# Patient Record
Sex: MALE | Race: OTHER | NOT HISPANIC OR LATINO | URBAN - METROPOLITAN AREA
[De-identification: names, ages, dates, MRNs, and addresses within clinical notes are randomized per-mention and may not be internally consistent; named-entity substitution may affect disease eponyms.]

---

## 2020-11-19 ENCOUNTER — EMERGENCY (EMERGENCY)
Facility: HOSPITAL | Age: 45
LOS: 1 days | End: 2020-11-19
Attending: EMERGENCY MEDICINE
Payer: MEDICAID

## 2020-11-19 VITALS
SYSTOLIC BLOOD PRESSURE: 125 MMHG | RESPIRATION RATE: 19 BRPM | OXYGEN SATURATION: 98 % | TEMPERATURE: 98 F | DIASTOLIC BLOOD PRESSURE: 80 MMHG | HEART RATE: 72 BPM

## 2020-11-19 VITALS
RESPIRATION RATE: 18 BRPM | SYSTOLIC BLOOD PRESSURE: 155 MMHG | OXYGEN SATURATION: 99 % | DIASTOLIC BLOOD PRESSURE: 89 MMHG | HEART RATE: 70 BPM

## 2020-11-19 LAB
ALBUMIN SERPL ELPH-MCNC: 4.4 G/DL — SIGNIFICANT CHANGE UP (ref 3.3–5)
ALP SERPL-CCNC: 141 U/L — HIGH (ref 40–120)
ALT FLD-CCNC: 26 U/L — SIGNIFICANT CHANGE UP (ref 10–45)
ANION GAP SERPL CALC-SCNC: 12 MMOL/L — SIGNIFICANT CHANGE UP (ref 5–17)
APAP SERPL-MCNC: <15 UG/ML — SIGNIFICANT CHANGE UP (ref 10–30)
APPEARANCE UR: CLEAR — SIGNIFICANT CHANGE UP
APTT BLD: 27.5 SEC — SIGNIFICANT CHANGE UP (ref 27.5–35.5)
AST SERPL-CCNC: 25 U/L — SIGNIFICANT CHANGE UP (ref 10–40)
BASOPHILS # BLD AUTO: 0.01 K/UL — SIGNIFICANT CHANGE UP (ref 0–0.2)
BASOPHILS NFR BLD AUTO: 0.1 % — SIGNIFICANT CHANGE UP (ref 0–2)
BILIRUB SERPL-MCNC: 0.6 MG/DL — SIGNIFICANT CHANGE UP (ref 0.2–1.2)
BILIRUB UR-MCNC: NEGATIVE — SIGNIFICANT CHANGE UP
BLD GP AB SCN SERPL QL: NEGATIVE — SIGNIFICANT CHANGE UP
BUN SERPL-MCNC: 16 MG/DL — SIGNIFICANT CHANGE UP (ref 7–23)
CALCIUM SERPL-MCNC: 9.6 MG/DL — SIGNIFICANT CHANGE UP (ref 8.4–10.5)
CHLORIDE SERPL-SCNC: 99 MMOL/L — SIGNIFICANT CHANGE UP (ref 96–108)
CO2 SERPL-SCNC: 26 MMOL/L — SIGNIFICANT CHANGE UP (ref 22–31)
COLOR SPEC: YELLOW — SIGNIFICANT CHANGE UP
CREAT SERPL-MCNC: 0.88 MG/DL — SIGNIFICANT CHANGE UP (ref 0.5–1.3)
DIFF PNL FLD: NEGATIVE — SIGNIFICANT CHANGE UP
EOSINOPHIL # BLD AUTO: 0.01 K/UL — SIGNIFICANT CHANGE UP (ref 0–0.5)
EOSINOPHIL NFR BLD AUTO: 0.1 % — SIGNIFICANT CHANGE UP (ref 0–6)
ETHANOL SERPL-MCNC: SIGNIFICANT CHANGE UP MG/DL (ref 0–10)
GLUCOSE SERPL-MCNC: 140 MG/DL — HIGH (ref 70–99)
GLUCOSE UR QL: NEGATIVE — SIGNIFICANT CHANGE UP
HCT VFR BLD CALC: 42.1 % — SIGNIFICANT CHANGE UP (ref 39–50)
HGB BLD-MCNC: 14.2 G/DL — SIGNIFICANT CHANGE UP (ref 13–17)
IMM GRANULOCYTES NFR BLD AUTO: 0.4 % — SIGNIFICANT CHANGE UP (ref 0–1.5)
INR BLD: 1.07 RATIO — SIGNIFICANT CHANGE UP (ref 0.88–1.16)
KETONES UR-MCNC: SIGNIFICANT CHANGE UP
LEUKOCYTE ESTERASE UR-ACNC: NEGATIVE — SIGNIFICANT CHANGE UP
LYMPHOCYTES # BLD AUTO: 0.73 K/UL — LOW (ref 1–3.3)
LYMPHOCYTES # BLD AUTO: 9.5 % — LOW (ref 13–44)
MCHC RBC-ENTMCNC: 30.5 PG — SIGNIFICANT CHANGE UP (ref 27–34)
MCHC RBC-ENTMCNC: 33.7 GM/DL — SIGNIFICANT CHANGE UP (ref 32–36)
MCV RBC AUTO: 90.5 FL — SIGNIFICANT CHANGE UP (ref 80–100)
MONOCYTES # BLD AUTO: 0.61 K/UL — SIGNIFICANT CHANGE UP (ref 0–0.9)
MONOCYTES NFR BLD AUTO: 7.9 % — SIGNIFICANT CHANGE UP (ref 2–14)
NEUTROPHILS # BLD AUTO: 6.32 K/UL — SIGNIFICANT CHANGE UP (ref 1.8–7.4)
NEUTROPHILS NFR BLD AUTO: 82 % — HIGH (ref 43–77)
NITRITE UR-MCNC: NEGATIVE — SIGNIFICANT CHANGE UP
NRBC # BLD: 0 /100 WBCS — SIGNIFICANT CHANGE UP (ref 0–0)
PH UR: 6.5 — SIGNIFICANT CHANGE UP (ref 5–8)
PLATELET # BLD AUTO: 163 K/UL — SIGNIFICANT CHANGE UP (ref 150–400)
POTASSIUM SERPL-MCNC: 4.2 MMOL/L — SIGNIFICANT CHANGE UP (ref 3.5–5.3)
POTASSIUM SERPL-SCNC: 4.2 MMOL/L — SIGNIFICANT CHANGE UP (ref 3.5–5.3)
PROT SERPL-MCNC: 7.8 G/DL — SIGNIFICANT CHANGE UP (ref 6–8.3)
PROT UR-MCNC: ABNORMAL
PROTHROM AB SERPL-ACNC: 12.8 SEC — SIGNIFICANT CHANGE UP (ref 10.6–13.6)
RBC # BLD: 4.65 M/UL — SIGNIFICANT CHANGE UP (ref 4.2–5.8)
RBC # FLD: 12.8 % — SIGNIFICANT CHANGE UP (ref 10.3–14.5)
RH IG SCN BLD-IMP: POSITIVE — SIGNIFICANT CHANGE UP
SALICYLATES SERPL-MCNC: <2 MG/DL — LOW (ref 15–30)
SARS-COV-2 RNA SPEC QL NAA+PROBE: SIGNIFICANT CHANGE UP
SODIUM SERPL-SCNC: 137 MMOL/L — SIGNIFICANT CHANGE UP (ref 135–145)
SP GR SPEC: 1.03 — HIGH (ref 1.01–1.02)
UROBILINOGEN FLD QL: NEGATIVE — SIGNIFICANT CHANGE UP
WBC # BLD: 7.71 K/UL — SIGNIFICANT CHANGE UP (ref 3.8–10.5)
WBC # FLD AUTO: 7.71 K/UL — SIGNIFICANT CHANGE UP (ref 3.8–10.5)

## 2020-11-19 PROCEDURE — 99291 CRITICAL CARE FIRST HOUR: CPT

## 2020-11-19 PROCEDURE — U0003: CPT

## 2020-11-19 PROCEDURE — 80053 COMPREHEN METABOLIC PANEL: CPT

## 2020-11-19 PROCEDURE — 86850 RBC ANTIBODY SCREEN: CPT

## 2020-11-19 PROCEDURE — 99285 EMERGENCY DEPT VISIT HI MDM: CPT | Mod: 25

## 2020-11-19 PROCEDURE — 70450 CT HEAD/BRAIN W/O DYE: CPT | Mod: 26

## 2020-11-19 PROCEDURE — 85610 PROTHROMBIN TIME: CPT

## 2020-11-19 PROCEDURE — 86901 BLOOD TYPING SEROLOGIC RH(D): CPT

## 2020-11-19 PROCEDURE — 70450 CT HEAD/BRAIN W/O DYE: CPT

## 2020-11-19 PROCEDURE — 85025 COMPLETE CBC W/AUTO DIFF WBC: CPT

## 2020-11-19 PROCEDURE — 81001 URINALYSIS AUTO W/SCOPE: CPT

## 2020-11-19 PROCEDURE — 86900 BLOOD TYPING SEROLOGIC ABO: CPT

## 2020-11-19 PROCEDURE — 80307 DRUG TEST PRSMV CHEM ANLYZR: CPT

## 2020-11-19 PROCEDURE — 85730 THROMBOPLASTIN TIME PARTIAL: CPT

## 2020-11-19 RX ORDER — LIDOCAINE 4 G/100G
1 CREAM TOPICAL ONCE
Refills: 0 | Status: COMPLETED | OUTPATIENT
Start: 2020-11-19 | End: 2020-11-19

## 2020-11-19 RX ORDER — LEVETIRACETAM 250 MG/1
1 TABLET, FILM COATED ORAL
Qty: 14 | Refills: 0
Start: 2020-11-19 | End: 2020-11-25

## 2020-11-19 RX ADMIN — LIDOCAINE 1 PATCH: 4 CREAM TOPICAL at 17:46

## 2020-11-19 NOTE — ED PROVIDER NOTE - CRITICAL CARE INDICATION, MLM
patient was critically ill... Patient was critically ill with a high probability of imminent or life threatening deterioration due to subdural hematoma.

## 2020-11-19 NOTE — ED PROVIDER NOTE - PROGRESS NOTE DETAILS
Spoke to nsgy, who will come see pt. Requests 6 hr repeat scan. Will obtain at 3 pm. - Josep Barrios MD Nazario Sweet, PGY-3: dicussed Nazario Sweet, PGY-3: discussed results with NSx, No change in CT head, patient states headache resolved, walking with no gait abnormalities, tolerating PO. Will dc with keppra and medrol dose manjula as per NSx.

## 2020-11-19 NOTE — ED PROVIDER NOTE - PATIENT PORTAL LINK FT
You can access the FollowMyHealth Patient Portal offered by [facility] by registering at the following website: [email or web address]. By joining [employer]’s FollowMyHealth portal, you will also be able to view your health information using other applications (apps) compatible with our system.

## 2020-11-19 NOTE — ED PROVIDER NOTE - NSFOLLOWUPINSTRUCTIONS_ED_ALL_ED_FT
Follow up with Neurosurgery in one week, a number has been provided above to call to arrange an appointment. You will also be called by the referral specialist to arrange an appointment.     Return to the emergency department if you experience worsening headaches, decreased level of consciousness, persistent nausea or vomiting, numbness or weakness.     For pain:  Take Tylenol 650mg every 4-6 hours as needed for pain, do not exceed 3,250mg per day    For back pain use lidocaine patch applied to the affected area.     You have been prescribed the following medications   Levetiracetam 500mg to be take every  12 hours for seizure prophylaxis     Medrol Dose Heraclio, take as directed on package for 1 week for inflammation

## 2020-11-19 NOTE — ED ADULT NURSE NOTE - OBJECTIVE STATEMENT
45 y male transfer from [facility] presents BIBEMS with 2 mm SDH. Patient had fall 2 days ago; was seen at [facility] and discharged last night with pain control. Reports to increased HA today; received CT which showed SDH. Was given pain control with Naproxen and Percocet; became nauseous and vomited. Was given Zofran and Pepcid. Reports to relief in N/V and reports 2/10 HA on presentation. Denies fevers, chills, lightheadedness, dizziness, chest pain, SOB. A&Ox3. Skin warm dry and intact. Neuro intact- 5/5 strength and sensation b/l. Breathing comfortably in bed- no distress. Abdomen soft nontender nondistended. Safety maintained- bed locked in lowest position.

## 2020-11-19 NOTE — ED PROVIDER NOTE - NS ED ROS FT
GENERAL: No fever or chills, EYES: no change in vision, HEENT: no trouble speaking, CARDIAC: no chest pain, palpitation PULMONARY: no cough or SOB, GI: no abdominal pain, + nausea, + vomiting, no diarrhea or constipation, : No changes in urination, SKIN: no rashes, NEURO: + headache,  MSK: No muscle pain ~Thea Mcwilliams MD

## 2020-11-19 NOTE — SBIRT NOTE ADULT - NSSBIRTBRIEFINTDET_GEN_A_CORE
Patient acknowledges he was intoxicated when he fell down a flight of stairs. Per patient, he has been admitted to OSH for alcohol intoxication in the past. Patient reports no h/o treatment. Per patient, he drinks 4-5 shots of liquor 3-4 times a week. Patient educated on AUDIT score and indication of at-risk use. Patient counseled on long term effects of extensive alcohol use. Patient minimizing alcohol use and impact on health. Patient counseled on harm reduction and reports he is able to cut back alcohol use. Patient offered and declined resources.

## 2020-11-19 NOTE — CONSULT NOTE ADULT - ASSESSMENT
Leola, Vitaliy  45M xfer [facility] for minimal falcine SDH, 2mm thin. Fell 2 days ago down stairs. Now reports HA. No visual changes, neuro symptoms. AAOx3, EOMI, no facial, no drift, MARTNIEZ 5/5.   -Repeat CTH now, if stable no neurosurgical contraindication to DC  -If repeat CT stable can discharge on medrol dose pack to help with pain

## 2020-11-19 NOTE — ED ADULT NURSE REASSESSMENT NOTE - NS ED NURSE REASSESS COMMENT FT1
(Covering RN [id number]) Patient resting in bed alert and oriented x 4, respirations even and nonlabored, breathing spontaneously on room air. Reports headache earlier today, denies all complaints at this time. No acute distress noted. Pending repeat CT head. Patient updated on plan of care. Will continue ot monitor. AARON Morales

## 2020-11-19 NOTE — ED PROVIDER NOTE - NSFOLLOWUPCLINICS_GEN_ALL_ED_FT
Neurosurgery at [place]  Neurosurgery  [address], [address]  [place] [address]  Phone: [phone]  Fax:   Follow Up Time:     [facility] [place]  Neurosurgery/Spine  [address], [address]  [place] [address]  Phone: [phone]  Fax:   Follow Up Time:     [facility] - [facility]  Neurosurgery/Spine  [address] [place] [address]  Phone: [phone]  Fax:   Follow Up Time:

## 2020-11-19 NOTE — ED PROVIDER NOTE - ATTENDING CONTRIBUTION TO CARE
45M, hx etoh abuse (denies hx hospitalizations or withdrawal sx), not on a/c, presents as transfer from [facility] with sdh. Pt states two nights ago had drank a large amount of alcohol and fell down the stairs. Does not recall the fall. Since has had headache, severe, primarily L-sided. Yesterday a/w n/v. Denies vision changes, numbness, weakness, dizziness, ataxia. Denies injuries to extremities. Denies neck or back pain.    PE: NAD, NCAT, MMM, Trachea midline, Normal conjunctiva, lungs CTAB, S1/S2 RRR, Normal perfusion, 2+ radial pulses bilat, Abdomen Soft, NTND, No rebound/guarding, No LE edema, No deformity of extremities, No rashes,  No focal motor or sensory deficits. CN II-XII intact. Visual fields intact. EOMI, PERRLA. Facial sensation equal bilat. Smile and eye closure equal bilat. Hearing intact bilat. Palate elevation equal, tongue protrusion midline. Lateral head rotation equal bilat. 5/5 strength UE and LE bilat. Sensation grossly intact. No pronator drift.    Pt received multiple analgesics at outside hospital, headache is currently mild. No focal deficits. Obtain pre-op labs. Consult neurosurgery. Repeat imaging per neurosurgery requests. - Josep Barrios MD

## 2020-11-19 NOTE — ED PROVIDER NOTE - PHYSICAL EXAMINATION
Gen: AAOx3, non-toxic  Head: NCAT  HEENT: EOMI, PERRLA, oral mucosa moist, normal conjunctiva  Lung: CTAB, no respiratory distress, no wheezes/rhonchi/rales B/L, speaking in full sentences  CV: RRR, no murmurs, rubs or gallops  Abd: soft, NTND, no guarding, no CVA tenderness, no rebound tenderness  MSK: no visible deformities, full range of motion of all 4 exts  Neuro: No focal sensory or motor deficits  Skin: Warm, well perfused, no rash  Psych: normal affect.   ~Thea Mcwilliams MD

## 2020-11-19 NOTE — CONSULT NOTE ADULT - SUBJECTIVE AND OBJECTIVE BOX
p (2230)     Leola, Vitaliy  45M xfer [facility] for minimal falcine SDH, 2mm thin. Fell 2 days ago down stairs. Now reports HA. No visual changes, neuro symptoms.     AAOx3, EOMI, no facial, no drift, MARTINEZ 5/5.     --Anticoagulation:    =====================  PAST MEDICAL HISTORY   No pertinent past medical history      PAST SURGICAL HISTORY   No significant past surgical history          MEDICATIONS:  Antibiotics:    Neuro:    Other:      SOCIAL HISTORY:   Occupation:   Marital Status:     FAMILY HISTORY:      ROS: Negative except per HPI    LABS:  PT/INR - ( 19 Nov 2020 11:40 )   PT: 12.8 sec;   INR: 1.07 ratio         PTT - ( 19 Nov 2020 11:40 )  PTT:27.5 sec                        14.2   7.71  )-----------( 163      ( 19 Nov 2020 11:40 )             42.1     11-19    137  |  99  |  16  ----------------------------<  140<H>  4.2   |  26  |  0.88    Ca    9.6      19 Nov 2020 11:40    TPro  7.8  /  Alb  4.4  /  TBili  0.6  /  DBili  x   /  AST  25  /  ALT  26  /  AlkPhos  141<H>  11-19

## 2020-11-19 NOTE — ED PROVIDER NOTE - CLINICAL SUMMARY MEDICAL DECISION MAKING FREE TEXT BOX
Thea Mcwilliams MD: SDH will get basic labs, tox screen, rpt interval 4-6 hr cth and neurosurgery consult

## 2020-11-19 NOTE — ED PROVIDER NOTE - OBJECTIVE STATEMENT
Thea Mcwilliams MD: 44 yo M transfer from [place] for SDH 2mm. Pt states that he fell while walking down the stairs x days ago. Pt reports drinking EtOH during the incident. Pt report HA and n/v went to the OSH and was given tylenol then d/hadley. Pt reports that he return to the OSH this morning because he symptoms of HA and nausea worsening. last etoh was 2 days ago.

## 2020-11-22 LAB
AMPHET UR-MCNC: NEGATIVE — SIGNIFICANT CHANGE UP
BARBITURATES, URINE.: NEGATIVE — SIGNIFICANT CHANGE UP
BENZODIAZ UR-MCNC: NEGATIVE — SIGNIFICANT CHANGE UP
COCAINE METAB.OTHER UR-MCNC: NEGATIVE — SIGNIFICANT CHANGE UP
CREATININE, URINE THERAPEUTIC: 211.9 MG/DL — SIGNIFICANT CHANGE UP
METHADONE UR-MCNC: NEGATIVE — SIGNIFICANT CHANGE UP
METHAQUALONE UR QL: NEGATIVE — SIGNIFICANT CHANGE UP
METHAQUALONE UR-MCNC: NEGATIVE — SIGNIFICANT CHANGE UP
OPIATES UR-MCNC: NEGATIVE — SIGNIFICANT CHANGE UP
PCP UR-MCNC: NEGATIVE — SIGNIFICANT CHANGE UP
PROPOXYPH UR QL: NEGATIVE — SIGNIFICANT CHANGE UP
THC UR QL: NEGATIVE — SIGNIFICANT CHANGE UP